# Patient Record
Sex: MALE | Race: WHITE | NOT HISPANIC OR LATINO | Employment: OTHER | URBAN - METROPOLITAN AREA
[De-identification: names, ages, dates, MRNs, and addresses within clinical notes are randomized per-mention and may not be internally consistent; named-entity substitution may affect disease eponyms.]

---

## 2021-07-10 ENCOUNTER — RECORDS - HEALTHEAST (OUTPATIENT)
Dept: LAB | Facility: CLINIC | Age: 86
End: 2021-07-10

## 2021-07-10 LAB
ALBUMIN UR-MCNC: ABNORMAL G/DL
APPEARANCE UR: ABNORMAL
BACTERIA #/AREA URNS HPF: ABNORMAL /[HPF]
BILIRUB UR QL STRIP: NEGATIVE
COLOR UR AUTO: YELLOW
GLUCOSE UR STRIP-MCNC: NEGATIVE MG/DL
HGB UR QL STRIP: ABNORMAL
KETONES UR STRIP-MCNC: NEGATIVE MG/DL
LEUKOCYTE ESTERASE UR QL STRIP: ABNORMAL
MUCOUS THREADS #/AREA URNS LPF: PRESENT LPF
NITRATE UR QL: NEGATIVE
PH UR STRIP: 7 [PH] (ref 5–8)
RBC URINE: >182 HPF
SP GR UR STRIP: 1.01 (ref 1–1.03)
SQUAMOUS EPITHELIAL: 0 /HPF
UROBILINOGEN UR STRIP-ACNC: ABNORMAL
WBC CLUMPS #/AREA URNS HPF: PRESENT /[HPF]
WBC URINE: >182 HPF

## 2021-07-12 LAB — BACTERIA SPEC CULT: ABNORMAL

## 2021-08-11 ENCOUNTER — LAB REQUISITION (OUTPATIENT)
Dept: LAB | Facility: CLINIC | Age: 86
End: 2021-08-11
Payer: MEDICARE

## 2021-08-11 DIAGNOSIS — Z01.812 ENCOUNTER FOR PREPROCEDURAL LABORATORY EXAMINATION: ICD-10-CM

## 2021-08-11 LAB
ALBUMIN UR-MCNC: 10 MG/DL
APPEARANCE UR: CLEAR
BACTERIA #/AREA URNS HPF: ABNORMAL /HPF
BILIRUB UR QL STRIP: NEGATIVE
COLOR UR AUTO: ABNORMAL
GLUCOSE UR STRIP-MCNC: NEGATIVE MG/DL
HGB UR QL STRIP: ABNORMAL
KETONES UR STRIP-MCNC: NEGATIVE MG/DL
LEUKOCYTE ESTERASE UR QL STRIP: ABNORMAL
NITRATE UR QL: NEGATIVE
PH UR STRIP: 7.5 [PH] (ref 5–7)
RBC URINE: 113 /HPF
SP GR UR STRIP: 1.01 (ref 1–1.03)
UROBILINOGEN UR STRIP-MCNC: <2 MG/DL
WBC URINE: 7 /HPF

## 2021-08-11 PROCEDURE — 81001 URINALYSIS AUTO W/SCOPE: CPT | Mod: ORL | Performed by: PHYSICIAN ASSISTANT

## 2022-01-28 ENCOUNTER — NURSE TRIAGE (OUTPATIENT)
Dept: NURSING | Facility: CLINIC | Age: 87
End: 2022-01-28
Payer: MEDICARE

## 2022-01-28 NOTE — TELEPHONE ENCOUNTER
Caller: Daniel -  [facility].  Phone # [phone]    Caller asking for discharge recommendations if patient is appropriate to stay at the Assisted Living following a nephrostomy.    FNA advised that we do not have records. Caller uncertain if pt was admitted at [facility] or a different hospital. FNA advised to have him reach out to family members to know where pt was admitted. He verbalized understanding.    Maci Bermudez RN/[place] Nurse Advisor      Reason for Disposition    Nursing judgment    Protocols used: NO GUIDELINE OR REFERENCE [id number]

## 2022-04-28 ENCOUNTER — LAB REQUISITION (OUTPATIENT)
Dept: LAB | Facility: CLINIC | Age: 87
End: 2022-04-28
Payer: MEDICARE

## 2022-04-28 DIAGNOSIS — I82.409 ACUTE EMBOLISM AND THROMBOSIS OF UNSPECIFIED DEEP VEINS OF UNSPECIFIED LOWER EXTREMITY (H): ICD-10-CM

## 2022-04-28 LAB
ANION GAP SERPL CALCULATED.3IONS-SCNC: 3 MMOL/L (ref 3–14)
BUN SERPL-MCNC: 27 MG/DL (ref 7–30)
CALCIUM SERPL-MCNC: 8.3 MG/DL (ref 8.5–10.1)
CHLORIDE BLD-SCNC: 109 MMOL/L (ref 94–109)
CO2 SERPL-SCNC: 27 MMOL/L (ref 20–32)
CREAT SERPL-MCNC: 1.21 MG/DL (ref 0.66–1.25)
ERYTHROCYTE [DISTWIDTH] IN BLOOD BY AUTOMATED COUNT: ABNORMAL %
GFR SERPL CREATININE-BSD FRML MDRD: 57 ML/MIN/1.73M2
GLUCOSE BLD-MCNC: 129 MG/DL (ref 70–99)
HCT VFR BLD AUTO: 25.8 % (ref 40–53)
HGB BLD-MCNC: 8 G/DL (ref 13.3–17.7)
MCH RBC QN AUTO: 36.5 PG (ref 26.5–33)
MCHC RBC AUTO-ENTMCNC: 31 G/DL (ref 31.5–36.5)
MCV RBC AUTO: 118 FL (ref 78–100)
PLATELET # BLD AUTO: 197 10E3/UL (ref 150–450)
POTASSIUM BLD-SCNC: 4.2 MMOL/L (ref 3.4–5.3)
RBC # BLD AUTO: 2.19 10E6/UL (ref 4.4–5.9)
SODIUM SERPL-SCNC: 139 MMOL/L (ref 133–144)
WBC # BLD AUTO: 4.4 10E3/UL (ref 4–11)

## 2022-04-28 PROCEDURE — 80048 BASIC METABOLIC PNL TOTAL CA: CPT | Mod: ORL | Performed by: NURSE PRACTITIONER

## 2022-04-28 PROCEDURE — 85027 COMPLETE CBC AUTOMATED: CPT | Mod: ORL | Performed by: NURSE PRACTITIONER

## 2022-05-05 ENCOUNTER — LAB REQUISITION (OUTPATIENT)
Dept: LAB | Facility: CLINIC | Age: 87
End: 2022-05-05
Payer: MEDICARE

## 2022-05-05 DIAGNOSIS — I82.409 ACUTE EMBOLISM AND THROMBOSIS OF UNSPECIFIED DEEP VEINS OF UNSPECIFIED LOWER EXTREMITY (H): ICD-10-CM

## 2022-05-05 LAB — HGB BLD-MCNC: 7.9 G/DL (ref 13.3–17.7)

## 2022-05-05 PROCEDURE — 85018 HEMOGLOBIN: CPT | Mod: ORL | Performed by: NURSE PRACTITIONER

## 2022-05-09 ENCOUNTER — LAB REQUISITION (OUTPATIENT)
Dept: LAB | Facility: CLINIC | Age: 87
End: 2022-05-09
Payer: MEDICARE

## 2022-05-09 DIAGNOSIS — I82.409 ACUTE EMBOLISM AND THROMBOSIS OF UNSPECIFIED DEEP VEINS OF UNSPECIFIED LOWER EXTREMITY (H): ICD-10-CM

## 2022-05-09 LAB — HGB BLD-MCNC: 8.1 G/DL (ref 13.3–17.7)

## 2022-05-09 PROCEDURE — 85018 HEMOGLOBIN: CPT | Mod: ORL | Performed by: NURSE PRACTITIONER

## 2022-05-16 ENCOUNTER — LAB REQUISITION (OUTPATIENT)
Dept: LAB | Facility: CLINIC | Age: 87
End: 2022-05-16
Payer: MEDICARE

## 2022-05-16 DIAGNOSIS — I82.409 ACUTE EMBOLISM AND THROMBOSIS OF UNSPECIFIED DEEP VEINS OF UNSPECIFIED LOWER EXTREMITY (H): ICD-10-CM

## 2022-05-16 LAB — HGB BLD-MCNC: 9 G/DL (ref 13.3–17.7)

## 2022-05-16 PROCEDURE — 85018 HEMOGLOBIN: CPT | Mod: ORL | Performed by: NURSE PRACTITIONER

## 2022-05-23 ENCOUNTER — LAB REQUISITION (OUTPATIENT)
Dept: LAB | Facility: CLINIC | Age: 87
End: 2022-05-23
Payer: MEDICARE

## 2022-05-23 DIAGNOSIS — I82.409 ACUTE EMBOLISM AND THROMBOSIS OF UNSPECIFIED DEEP VEINS OF UNSPECIFIED LOWER EXTREMITY (H): ICD-10-CM

## 2022-05-23 LAB — HGB BLD-MCNC: 8.1 G/DL (ref 13.3–17.7)

## 2022-05-23 PROCEDURE — 85018 HEMOGLOBIN: CPT | Mod: ORL | Performed by: NURSE PRACTITIONER

## 2022-05-31 ENCOUNTER — LAB REQUISITION (OUTPATIENT)
Dept: LAB | Facility: CLINIC | Age: 87
End: 2022-05-31
Payer: MEDICARE

## 2022-05-31 DIAGNOSIS — I82.409 ACUTE EMBOLISM AND THROMBOSIS OF UNSPECIFIED DEEP VEINS OF UNSPECIFIED LOWER EXTREMITY (H): ICD-10-CM

## 2022-05-31 LAB — HGB BLD-MCNC: 7.9 G/DL (ref 13.3–17.7)

## 2022-05-31 PROCEDURE — 85018 HEMOGLOBIN: CPT | Mod: ORL | Performed by: NURSE PRACTITIONER

## 2022-06-06 ENCOUNTER — LAB REQUISITION (OUTPATIENT)
Dept: LAB | Facility: CLINIC | Age: 87
End: 2022-06-06
Payer: MEDICARE

## 2022-06-06 DIAGNOSIS — I82.409 ACUTE EMBOLISM AND THROMBOSIS OF UNSPECIFIED DEEP VEINS OF UNSPECIFIED LOWER EXTREMITY (H): ICD-10-CM

## 2022-06-06 LAB — HGB BLD-MCNC: 8.8 G/DL (ref 13.3–17.7)

## 2022-06-06 PROCEDURE — 85018 HEMOGLOBIN: CPT | Mod: ORL | Performed by: NURSE PRACTITIONER

## 2022-06-13 ENCOUNTER — LAB REQUISITION (OUTPATIENT)
Dept: LAB | Facility: CLINIC | Age: 87
End: 2022-06-13
Payer: MEDICARE

## 2022-06-13 DIAGNOSIS — D50.9 IRON DEFICIENCY ANEMIA, UNSPECIFIED: ICD-10-CM

## 2022-06-13 LAB — HGB BLD-MCNC: 8.1 G/DL (ref 13.3–17.7)

## 2022-06-13 PROCEDURE — 85018 HEMOGLOBIN: CPT | Mod: ORL | Performed by: NURSE PRACTITIONER

## 2022-07-05 ENCOUNTER — LAB REQUISITION (OUTPATIENT)
Dept: LAB | Facility: CLINIC | Age: 87
End: 2022-07-05
Payer: MEDICARE

## 2022-07-05 DIAGNOSIS — D50.9 IRON DEFICIENCY ANEMIA, UNSPECIFIED: ICD-10-CM

## 2022-07-05 DIAGNOSIS — D62 ACUTE POSTHEMORRHAGIC ANEMIA: ICD-10-CM

## 2022-07-05 LAB — HGB BLD-MCNC: 8.5 G/DL (ref 13.3–17.7)

## 2022-07-05 PROCEDURE — 85018 HEMOGLOBIN: CPT | Mod: ORL | Performed by: NURSE PRACTITIONER

## 2023-09-14 ENCOUNTER — HOSPITAL ENCOUNTER (EMERGENCY)
Facility: CLINIC | Age: 88
Discharge: HOME | End: 2023-09-15
Attending: PHYSICIAN ASSISTANT | Admitting: PHYSICIAN ASSISTANT
Payer: MEDICARE

## 2023-09-14 ENCOUNTER — APPOINTMENT (OUTPATIENT)
Dept: GENERAL RADIOLOGY | Facility: CLINIC | Age: 88
End: 2023-09-14
Attending: PHYSICIAN ASSISTANT
Payer: MEDICARE

## 2023-09-14 DIAGNOSIS — S81.801A AVULSION OF SKIN OF RIGHT LOWER LEG, INITIAL ENCOUNTER: ICD-10-CM

## 2023-09-14 DIAGNOSIS — S51.019A SKIN TEAR OF ELBOW WITHOUT COMPLICATION, INITIAL ENCOUNTER: ICD-10-CM
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
## 2023-09-14 PROCEDURE — 73590 X-RAY EXAM OF LOWER LEG: CPT | Mod: RT

## 2023-09-14 PROCEDURE — 250N000013 HC RX MED GY IP 250 OP 250 PS 637: Performed by: PHYSICIAN ASSISTANT

## 2023-09-14 PROCEDURE — 250N000011 HC RX IP 250 OP 636: Performed by: PHYSICIAN ASSISTANT

## 2023-09-14 PROCEDURE — 90715 TDAP VACCINE 7 YRS/> IM: CPT | Performed by: PHYSICIAN ASSISTANT

## 2023-09-14 PROCEDURE — 90471 IMMUNIZATION ADMIN: CPT | Performed by: PHYSICIAN ASSISTANT

## 2023-09-14 PROCEDURE — 73080 X-RAY EXAM OF ELBOW: CPT | Mod: LT

## 2023-09-14 PROCEDURE — 99284 EMERGENCY DEPT VISIT MOD MDM: CPT | Mod: 25

## 2023-09-14 RX ORDER — CEPHALEXIN 500 MG/1
500 CAPSULE ORAL ONCE
Status: COMPLETED | OUTPATIENT
Start: 2023-09-14 | End: 2023-09-14

## 2023-09-14 RX ORDER — CEPHALEXIN 500 MG/1
500 CAPSULE ORAL 3 TIMES DAILY
Qty: 15 CAPSULE | Refills: 0 | Status: SHIPPED | OUTPATIENT
Start: 2023-09-15 | End: 2023-09-20

## 2023-09-14 RX ADMIN — CEPHALEXIN 500 MG: 500 CAPSULE ORAL at 22:03

## 2023-09-14 RX ADMIN — CLOSTRIDIUM TETANI TOXOID ANTIGEN (FORMALDEHYDE INACTIVATED), CORYNEBACTERIUM DIPHTHERIAE TOXOID ANTIGEN (FORMALDEHYDE INACTIVATED), BORDETELLA PERTUSSIS TOXOID ANTIGEN (GLUTARALDEHYDE INACTIVATED), BORDETELLA PERTUSSIS FILAMENTOUS HEMAGGLUTININ ANTIGEN (FORMALDEHYDE INACTIVATED), BORDETELLA PERTUSSIS PERTACTIN ANTIGEN, AND BORDETELLA PERTUSSIS FIMBRIAE 2/3 ANTIGEN 0.5 ML: 5; 2; 2.5; 5; 3; 5 INJECTION, SUSPENSION INTRAMUSCULAR at 20:19

## 2023-09-14 ASSESSMENT — ACTIVITIES OF DAILY LIVING (ADL)
ADLS_ACUITY_SCORE: 35
ADLS_ACUITY_SCORE: 35

## 2023-09-15 VITALS
SYSTOLIC BLOOD PRESSURE: 128 MMHG | HEART RATE: 70 BPM | OXYGEN SATURATION: 98 % | DIASTOLIC BLOOD PRESSURE: 80 MMHG | RESPIRATION RATE: 18 BRPM | TEMPERATURE: 98 F | BODY MASS INDEX: 18.56 KG/M2 | WEIGHT: 137 LBS | HEIGHT: 72 IN

## 2023-09-15 ASSESSMENT — ACTIVITIES OF DAILY LIVING (ADL): ADLS_ACUITY_SCORE: 35

## 2023-09-15 NOTE — ED NOTES
Emergency Department Technician Wound Irrigation Note:    9/14/2023    8:37 PM      Wound location:  Left Shoulder, Left Arm, Right Leg    Irrigation Fluid: Normal Saline    Estimated Irrigation Volume (60 mL fluid per cm): 1000 ml    Matti Mcfarland

## 2023-09-15 NOTE — ED PROVIDER NOTES
"    History     Chief Complaint:  Fall       HPI   Bird Burgess is a 89 year old male who presents via EMS from assisted living facilty after he caught his foot on something while trying maneuver his walker in the bathroom. He fell suffering a tear of the skin to his RLE as well as some bruising and pain to L elbow with skin tear.  He denies loss of consciousness or head trauma, as he remembers the entire fall. He sustained a laceration to both his right shin and left arm and elbow. The patient feels fine now, but denies other pain specifically no neck pain, headache, dizziness, chills, chest pain, sob, abdominal pain, numbness or weakness.  He is not on blood thinners.  Uses a walker to ambulate at baseline.    Independent Historian:    EMS reports and affirms above hx as reported by facility, they report patient did not stand after falling but they also didn't try to stand him up.    Review of External Notes:  none      Medications:    Vitamin D  Lexapro  Ferosul  Proscar  Robaxin  Protonix  Deltasone  Senokot  Mylicon  Flomax    Past Medical History:    Anxiety  Blood loss anemia  Basal cell carcinoma  Benign prostatic hyperplasia  DDD  GI hemorrhage  Levator syndrome  Lumbar scoliosis  Nephrolithiasis  Venous insufficiency  Thrombocytopenia  Hyperbilirubinemia    Past Surgical History:    Hernia repair  Thoracotomy  Lithotripsy  Malignant skin lesion excision  Knee arthroscopy, left  Colonoscopy  MOHS surgery  Cystolitholapaxy, retro pyelogram stent left     Physical Exam   Patient Vitals for the past 24 hrs:   BP Temp Temp src Pulse Resp SpO2 Height Weight   09/14/23 2102 139/69 -- -- 64 18 94 % -- --   09/14/23 1953 (!) 155/76 98  F (36.7  C) Oral 72 20 97 % 1.816 m (5' 11.5\") 62.1 kg (137 lb)      Physical Exam  General: Alert and awake. Elderly appering male.  Lying on gurney watching TV.  Head:  Scalp is NC/AT without bruising, hematomas.  Eyes:  Globes normal and atraumatic.  PERRL, EOMI   ENT:  No " bruising of facial bone ttp or step-offs.    TM's normal bilaterally    Oropharynx atraumatic.  Neck:  Normal range of motion without rigidity. No bruising or swelling.  CV:  Regular rate and rhythm. No M/R/G.  Resp:  Breath sounds are clear bilaterally. Normal effort.  Abdomen: Abdomen is soft, no distension, no tenderness, no masses.  MS:  No midline cervical, thoracic, or lumbar tenderness    No tenderness over sternum, scapula, ribs, clavicles.    PROM of all joints without pain. No bony ttp  Skin:  Large RLE triangular pretibial skin tear/avulsion which extends to subcutaneous tissue but not through muscle/tendon/deeper tissues.  Tissue flap in tact and present appears to be well perfused at present retracts into position easily.  No FB or bleeding vessels. Several very superficial skin tears to L elbow distal upper arm.  No lacerations or active bleeding.  Cap refill <2 seconds in fingers and toes.  2+DP and PT and radial pulses BL.   Neuro:  Alert and oriented.  Strength and sensation grossly intact in all 4 extremities.  Cranial nerves  2-12 intact. GCS: 15. Ambulates well with walker.  Psych:  Alert.  Normal affect.  Cooperative.      Emergency Department Course     Imaging:  Elbow  XR, G/E 3 views, left   Final Result   IMPRESSION: No acute fracture. Small chronic avulsion fracture of the lateral epicondyle. Small effusion. Osteopenia.      XR Tibia and Fibula Right 2 Views   Final Result   IMPRESSION: No fracture. No foreign body. Superficial soft tissue injury along the lateral aspect of the proximal half of the calf.        Report per radiology    Emergency Department Course & Assessments:       Interventions:  Medications   Tdap (tetanus-diphtheria-acell pertussis) (ADACEL) injection 0.5 mL (0.5 mLs Intramuscular $Given 9/14/23 2019)   cephALEXin (KEFLEX) capsule 500 mg (500 mg Oral $Given 9/14/23 2203)      Assessments:  2004 I obtained history and examined the patient as noted above.   2141 I  performed steri striped the patient's leg. I deemed the patient safe to discharge home.    Independent Interpretation (X-rays, CTs, rhythm strip):  I independently interpreted the patient's left elbow and right tib/fib x-rays note no evidence of fx, dislocation or radioopaque fb.    Consultations/Discussion of Management or Tests:  None       Social Determinants of Health affecting care:  Transportation complicates follow-up as pt does not drive lives at assisted living facility.  Does have family present who can be available sometimes to transport.     Disposition:  The patient was discharged to home.     Impression & Plan    CMS Diagnoses: None    Medical Decision Making:  Very pleasant 89-year-old male presents by EMS from assisted living facility after what sounds like clear mechanical fall while maneuvering his walker in a tight space.  Has fairly large skin tear/avulsion to the right lower extremity pretibial space but fortunately not overly deep.  Also superficial skin tears and bruising to the left elbow/distal upper arm.  X-rays negative for fracture or dislocation.  CMS intact no evidence of tendon injury, foreign body etc.  No LOC patient awake alert oriented able to clearly relay history of event do not suspect neurologic/cardiopulmonary/metabolic cause of episode.  Head to toe trauma exam otherwise nonconcerning.    Wounds were cleaned.  Not amenable to sutures as I discussed with patient and daughter/son-in-law.  No indication for emergent surgical/plastics consult.  Steri-Strips were applied and skin avulsion/tear of right pretibial space was able to be closed fairly effectively.  That being said this will likely take significant amount of time to heal and should have some wound care.  I will place referral in epic which is ordered.  They also have a primary physician who visits the facility that he has at who may be able to assist with wound care and/or placing orders/referral for wound care at home  which I explained I am not able to do stable call that physician tomorrow to discuss further.  No evidence of infection at this time but given the patient's very advanced age and large skin avulsion/tear to the lower extremity I think it is reasonable to prophylax with short course of Keflex to prevent infection discussed to monitor for signs of development as well as signs of CMS compromise etc. and to return if these develop.  Wound care instructions provided to facility.    Diagnosis:    ICD-10-CM    1. Fall, initial encounter  W19.XXXA       2. Skin tear of elbow without complication, initial encounter  S51.019A       3. Avulsion of skin of right lower leg, initial encounter  S81.801A Wound Care Referral         Discharge Medications:  New Prescriptions    CEPHALEXIN (KEFLEX) 500 MG CAPSULE    Take 1 capsule (500 mg) by mouth 3 times daily for 5 days      Scribe Disclosure:  TANNER, Shaan Dos Santos, am serving as a scribe at 8:16 PM on 9/14/2023 to document services personally performed by Car Jimenez PA-C based on my observations and the provider's statements to me.               Car Jimenez PA-C  09/15/23 1079

## 2023-09-15 NOTE — ED TRIAGE NOTES
Presents via EMS from [facility]. Reports he was attempting to get up from the toilet when his walker got away from him causing a mechanical fall. Skin tear right lower leg and left elbow.

## 2023-09-18 ENCOUNTER — TELEPHONE (OUTPATIENT)
Dept: WOUND CARE | Facility: CLINIC | Age: 88
End: 2023-09-18
Payer: MEDICARE

## 2023-09-18 NOTE — TELEPHONE ENCOUNTER
Consult received via [employer] from Car Jimenez PA-C  for wound of the leg and elbows    Please schedule with Jaylin Bryant PA-C, Cheryl Pope NP, Harlan Hurtado M.D., and Patrice Escalante M.D. at [facility] for next available appointment.    **For all providers, Macie Varner PA-C, Dr. Bloom, Cheryl Pope NP or Dr. Escalante, please schedule a follow up 2-3 weeks after initial appointment.**  --If unable to schedule within 2-3 weeks then please place on cancellation list--    Is the patient able to make their own medical decisions? Yes    Can the patient be scheduled on a Thursday? Yes    Is patient a ROSEMARY lift? PLEASE INQUIRE WHEN MAKING THE APPOINTMENT AND PUT IN APPOINTMENT NOTES    Routing to  Wound Healing Scheduling.

## 2024-08-02 ENCOUNTER — APPOINTMENT (OUTPATIENT)
Dept: CT IMAGING | Facility: CLINIC | Age: 89
End: 2024-08-02
Attending: EMERGENCY MEDICINE
Payer: MEDICARE

## 2024-08-02 ENCOUNTER — APPOINTMENT (OUTPATIENT)
Dept: GENERAL RADIOLOGY | Facility: CLINIC | Age: 89
End: 2024-08-02
Attending: EMERGENCY MEDICINE
Payer: MEDICARE

## 2024-08-02 ENCOUNTER — HOSPITAL ENCOUNTER (EMERGENCY)
Facility: CLINIC | Age: 89
Discharge: HOME | End: 2024-08-02
Attending: EMERGENCY MEDICINE | Admitting: EMERGENCY MEDICINE
Payer: MEDICARE

## 2024-08-02 VITALS
DIASTOLIC BLOOD PRESSURE: 77 MMHG | HEART RATE: 69 BPM | OXYGEN SATURATION: 96 % | RESPIRATION RATE: 16 BRPM | SYSTOLIC BLOOD PRESSURE: 139 MMHG | TEMPERATURE: 98 F
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
## 2024-08-02 DIAGNOSIS — M54.50 CHRONIC LOW BACK PAIN, UNSPECIFIED BACK PAIN LATERALITY, UNSPECIFIED WHETHER SCIATICA PRESENT: ICD-10-CM

## 2024-08-02 DIAGNOSIS — S01.01XA SCALP LACERATION, INITIAL ENCOUNTER: ICD-10-CM

## 2024-08-02 DIAGNOSIS — N20.0 NEPHROLITHIASIS: ICD-10-CM

## 2024-08-02 DIAGNOSIS — G89.29 CHRONIC LOW BACK PAIN, UNSPECIFIED BACK PAIN LATERALITY, UNSPECIFIED WHETHER SCIATICA PRESENT: ICD-10-CM

## 2024-08-02 LAB
ALBUMIN SERPL BCG-MCNC: 3.5 G/DL (ref 3.5–5.2)
ALP SERPL-CCNC: 98 U/L (ref 40–150)
ALT SERPL W P-5'-P-CCNC: 15 U/L (ref 0–70)
ANION GAP SERPL CALCULATED.3IONS-SCNC: 7 MMOL/L (ref 7–15)
AST SERPL W P-5'-P-CCNC: 22 U/L (ref 0–45)
ATRIAL RATE - MUSE: 55 BPM
BASOPHILS # BLD AUTO: 0 10E3/UL (ref 0–0.2)
BASOPHILS NFR BLD AUTO: 1 %
BILIRUB SERPL-MCNC: 0.6 MG/DL
BUN SERPL-MCNC: 31.1 MG/DL (ref 8–23)
CALCIUM SERPL-MCNC: 9 MG/DL (ref 8.8–10.4)
CHLORIDE SERPL-SCNC: 105 MMOL/L (ref 98–107)
CREAT SERPL-MCNC: 1.46 MG/DL (ref 0.67–1.17)
DIASTOLIC BLOOD PRESSURE - MUSE: NORMAL MMHG
EGFRCR SERPLBLD CKD-EPI 2021: 45 ML/MIN/1.73M2
EOSINOPHIL # BLD AUTO: 0.1 10E3/UL (ref 0–0.7)
EOSINOPHIL NFR BLD AUTO: 1 %
ERYTHROCYTE [DISTWIDTH] IN BLOOD BY AUTOMATED COUNT: 14.3 % (ref 10–15)
GLUCOSE SERPL-MCNC: 95 MG/DL (ref 70–99)
HCO3 SERPL-SCNC: 28 MMOL/L (ref 22–29)
HCT VFR BLD AUTO: 22.7 % (ref 40–53)
HGB BLD-MCNC: 7.8 G/DL (ref 13.3–17.7)
HOLD SPECIMEN: NORMAL
IMM GRANULOCYTES # BLD: 0 10E3/UL
IMM GRANULOCYTES NFR BLD: 0 %
INTERPRETATION ECG - MUSE: NORMAL
LYMPHOCYTES # BLD AUTO: 1.6 10E3/UL (ref 0.8–5.3)
LYMPHOCYTES NFR BLD AUTO: 45 %
MCH RBC QN AUTO: 41.5 PG (ref 26.5–33)
MCHC RBC AUTO-ENTMCNC: 34.4 G/DL (ref 31.5–36.5)
MCV RBC AUTO: 121 FL (ref 78–100)
MONOCYTES # BLD AUTO: 0.4 10E3/UL (ref 0–1.3)
MONOCYTES NFR BLD AUTO: 12 %
NEUTROPHILS # BLD AUTO: 1.5 10E3/UL (ref 1.6–8.3)
NEUTROPHILS NFR BLD AUTO: 40 %
NRBC # BLD AUTO: 0 10E3/UL
NRBC BLD AUTO-RTO: 0 /100
P AXIS - MUSE: 78 DEGREES
PLAT MORPH BLD: NORMAL
PLATELET # BLD AUTO: 152 10E3/UL (ref 150–450)
POTASSIUM SERPL-SCNC: 4.5 MMOL/L (ref 3.4–5.3)
PR INTERVAL - MUSE: 218 MS
PROT SERPL-MCNC: 5.9 G/DL (ref 6.4–8.3)
QRS DURATION - MUSE: 100 MS
QT - MUSE: 440 MS
QTC - MUSE: 420 MS
R AXIS - MUSE: 75 DEGREES
RBC # BLD AUTO: 1.88 10E6/UL (ref 4.4–5.9)
RBC MORPH BLD: NORMAL
SODIUM SERPL-SCNC: 140 MMOL/L (ref 135–145)
SYSTOLIC BLOOD PRESSURE - MUSE: NORMAL MMHG
T AXIS - MUSE: 78 DEGREES
VENTRICULAR RATE- MUSE: 55 BPM
WBC # BLD AUTO: 3.6 10E3/UL (ref 4–11)

## 2024-08-02 PROCEDURE — 99285 EMERGENCY DEPT VISIT HI MDM: CPT | Mod: 25

## 2024-08-02 PROCEDURE — 93005 ELECTROCARDIOGRAM TRACING: CPT

## 2024-08-02 PROCEDURE — 72100 X-RAY EXAM L-S SPINE 2/3 VWS: CPT

## 2024-08-02 PROCEDURE — 80053 COMPREHEN METABOLIC PANEL: CPT | Performed by: EMERGENCY MEDICINE

## 2024-08-02 PROCEDURE — 70450 CT HEAD/BRAIN W/O DYE: CPT | Mod: ME

## 2024-08-02 PROCEDURE — 85025 COMPLETE CBC W/AUTO DIFF WBC: CPT | Performed by: EMERGENCY MEDICINE

## 2024-08-02 PROCEDURE — 36415 COLL VENOUS BLD VENIPUNCTURE: CPT | Performed by: EMERGENCY MEDICINE

## 2024-08-02 PROCEDURE — 72192 CT PELVIS W/O DYE: CPT | Mod: MA

## 2024-08-02 PROCEDURE — 73502 X-RAY EXAM HIP UNI 2-3 VIEWS: CPT

## 2024-08-02 ASSESSMENT — COLUMBIA-SUICIDE SEVERITY RATING SCALE - C-SSRS
1. IN THE PAST MONTH, HAVE YOU WISHED YOU WERE DEAD OR WISHED YOU COULD GO TO SLEEP AND NOT WAKE UP?: NO
6. HAVE YOU EVER DONE ANYTHING, STARTED TO DO ANYTHING, OR PREPARED TO DO ANYTHING TO END YOUR LIFE?: NO
2. HAVE YOU ACTUALLY HAD ANY THOUGHTS OF KILLING YOURSELF IN THE PAST MONTH?: NO

## 2024-08-02 ASSESSMENT — ACTIVITIES OF DAILY LIVING (ADL)
ADLS_ACUITY_SCORE: 35

## 2024-08-02 NOTE — DISCHARGE INSTRUCTIONS
*You may resume diet and activities.  *Allow steristrips to fall off naturally.  *Return if you develop spreading rightness or warmth, altered mental status, vomiting, worsening pain, faint or feel like you will faint or become worse in any way.    Discharge Instructions  Back Pain  You were seen today for back pain. Back pain can have many causes, but most will get better without surgery or other specific treatment. Sometimes there is a herniated ( slipped ) disc. We do not usually do MRI scans to look for these right away, since most herniated discs will get better on their own with time.  Today, we did not find any evidence that your back pain was caused by a serious condition. However, sometimes symptoms develop over time and cannot be found during an emergency visit, so it is very important that you follow up with your primary provider.  Generally, every Emergency Department visit should have a follow-up clinic visit with either a primary or a specialty clinic/provider. Please follow-up as instructed by your emergency provider today.    Return to the Emergency Department if:  You develop a fever with your back pain.   You have weakness or change in sensation in one or both legs.  You lose control of your bowels or bladder, or cannot empty your bladder (cannot pee).  Your pain gets much worse.     Follow-up with your provider:  Unless your pain has completely gone away, please make an appointment with your provider within one week. Most of the routine care for back pain is available in a clinic and not the Emergency Department. You may need further management of your back pain, such as more pain medication, imaging such as an X-ray or MRI, or physical therapy.    What can I do to help myself?  Remain Active -- People are often afraid that they will hurt their back further or delay recovery by remaining active, but this is one of the best things you can do for your back. In fact, staying in bed for a long time to  rest is not recommended. Studies have shown that people with low back pain recover faster when they remain active. Movement helps to bring blood flow to the muscles and relieve muscle spasms as well as preventing loss of muscle strength.  Heat -- Using a heating pad can help with low back pain during the first few weeks. Do not sleep with a heating pad, as you can be burned.   Pain medications - You may take a pain medication such as Tylenol  (acetaminophen), Advil , Motrin  (ibuprofen) or Aleve  (naproxen).  If you were given a prescription for medicine here today, be sure to read all of the information (including the package insert) that comes with your prescription.  This will include important information about the medicine, its side effects, and any warnings that you need to know about.  The pharmacist who fills the prescription can provide more information and answer questions you may have about the medicine.  If you have questions or concerns that the pharmacist cannot address, please call or return to the Emergency Department.   Remember that you can always come back to the Emergency Department if you are not able to see your regular provider in the amount of time listed above, if you get any new symptoms, or if there is anything that worries you.

## 2024-08-02 NOTE — ED NOTES
Bed: ED20  Expected date:   Expected time:   Means of arrival:   Comments:  Shelley 2 89 M fall head strike no thinners eta [id number]

## 2024-08-02 NOTE — ED TRIAGE NOTES
PT arrives via EMS presenting from assisted living facility. Per EMS, pt slipped and hit the top of his head on the commode. Small skin tear to top of head. No LOC, no thinners. No head or neck pain.     Triage Assessment (Adult)       Row Name 08/02/24 0441          Triage Assessment    Airway WDL WDL        Respiratory WDL    Respiratory WDL WDL        Skin Circulation/Temperature WDL    Skin Circulation/Temperature WDL WDL        Cardiac WDL    Cardiac WDL WDL        Peripheral/Neurovascular WDL    Peripheral Neurovascular WDL WDL        Cognitive/Neuro/Behavioral WDL    Cognitive/Neuro/Behavioral WDL WDL

## 2024-08-02 NOTE — ED PROVIDER NOTES
Emergency Department Note      History of Present Illness     Chief Complaint   Fall    HPI   Bird Burgess is a 89 year old male with history of hypertension, chronic low back pain, and lumbar scoliosis  who presents to the ED for evaluation of a fall. The patient reports that he fell forward and hit his head on the lid of the toilet. He reports that he did not loose consciousness, had chest pain, or shortness of breath. The patient denies new or worsening back pack pain or pain elsewhere. His daughter had called and reported that her father has kidney issues and chronic back pain. The history is limited.    Independent Historian   Daughter as detailed above.    Review of External Notes   I consulted the patient's MIIC and his tetanus was updated in 2023.    Past Medical History     Medical History and Problem List   Actinic keratosis   Acute blood loss anemia   Anal fissure   Anxiety   Basal cell carcinoma   Bladder stone   BPH   Cognitive impairment  Chronic low back pain   Degenerative disc disease  Gastrointestinal hemorrhage   Hydronephrosis   Hyperbilirubinemia   Hypertension   Hypercholesteremia    Levator syndrome   Lumbar scoliosis   PMR   Pneumonia   Malignant neoplasm of scalp   Monoclonal gammopathy   Monoclonal paraproteinemia   Nephrolithiasis   Neural foraminal stenosis of lumbar spine  Urinary retention   Thrombocytopenia   UPJ obstruction   Varicose vein   Venous insufficiency   Visual impairment    Medications   Ativan   Deltasone   Flomax   Senokot   Robaxin   Mylanta   Mylicon   Lidoderm   Lexapro   Proscar   Protonix     Surgical History   No past surgical history on file.    Physical Exam     Patient Vitals for the past 24 hrs:   BP Temp Pulse Resp SpO2   08/02/24 0615 -- -- -- -- 97 %   08/02/24 0445 (!) 146/73 -- -- -- 98 %   08/02/24 0444 (!) 146/73 98  F (36.7  C) 54 16 100 %     Physical Exam  General: Well-nourished, appears uncomfortable, worried about the lotion on his head that  he states he needs to have wiped off.  eyes: PERRL, conjunctivae pink no scleral icterus or conjunctival injection  ENT:  Moist mucus membranes, posterior oropharynx clear without erythema or exudates  Respiratory:  Lungs clear to auscultation bilaterally, no crackles/rubs/wheezes.  Good air movement  CV: Normal rate and rhythm, no murmurs/rubs/gallops  GI:  Abdomen soft and non-distended.  Normoactive BS.  No tenderness, guarding or rebound  Skin: Warm, dry.  No rashes or petechiae.  1.0 cm curvilinear skin tear type laceration over the scalp.  Musculoskeletal: Diffuse lumbar spinal tenderness.  No thoracic or cervical spine tenderness.    Neuro: Alert and oriented to person/place/time.  No dysarthria.  No apparent cranial nerve deficits.  Moving all extremities.  Psychiatric: Normal affect      Diagnostics     Lab Results   Labs Ordered and Resulted from Time of ED Arrival to Time of ED Departure   COMPREHENSIVE METABOLIC PANEL - Abnormal       Result Value    Sodium 140      Potassium 4.5      Carbon Dioxide (CO2) 28      Anion Gap 7      Urea Nitrogen 31.1 (*)     Creatinine 1.46 (*)     GFR Estimate 45 (*)     Calcium 9.0      Chloride 105      Glucose 95      Alkaline Phosphatase 98      AST 22      ALT 15      Protein Total 5.9 (*)     Albumin 3.5      Bilirubin Total 0.6     CBC WITH PLATELETS AND DIFFERENTIAL - Abnormal    WBC Count 3.6 (*)     RBC Count 1.88 (*)     Hemoglobin 7.8 (*)     Hematocrit 22.7 (*)      (*)     MCH 41.5 (*)     MCHC 34.4      RDW 14.3      Platelet Count 152      % Neutrophils 40      % Lymphocytes 45      % Monocytes 12      % Eosinophils 1      % Basophils 1      % Immature Granulocytes 0      NRBCs per 100 WBC 0      Absolute Neutrophils 1.5 (*)     Absolute Lymphocytes 1.6      Absolute Monocytes 0.4      Absolute Eosinophils 0.1      Absolute Basophils 0.0      Absolute Immature Granulocytes 0.0      Absolute NRBCs 0.0     RBC AND PLATELET MORPHOLOGY    RBC Morphology  Confirmed RBC Indices      Platelet Assessment        Value: Automated Count Confirmed. Platelet morphology is normal.     Imaging   CT Pelvis Bone wo Contrast   Final Result   IMPRESSION:   1.  Chronic healed left inferior pubic ramus fracture deformity.   2.  No CT evidence for acute displaced sacral, pelvic or proximal   femur fracture. No CT finding for femoral head AVN.   3.  Degenerative change lower lumbar spine, both SI joints and each   hip.   4.  Sizable stones in each proximal renal collecting system with   bilateral pelviectasis, left larger than right and left-sided   nephrolithiasis. No definitive hydroureter or ureteral stone.   5.  Right inguinal hernia contains right colon and a portion of the   terminal ileum. No evidence for proximal intestinal obstruction.   6.  Additional incidental findings. See above.         CELENA ALFARO MD            SYSTEM ID:  [id number]      XR Pelvis w Hip Left 1 View   Final Result   IMPRESSION: Severe osteopenia. No displaced hip fracture or dislocation. Suggestion of contour irregularity along the left inferior pubic ramus concerning for fracture. Recommend CT for further evaluation. Severe degenerative change of the visualized    lower lumbar spine.      Lumbar spine XR, 2-3 views   Final Result   IMPRESSION: Diffuse osseous demineralization limits evaluation for nondisplaced fractures. Age indeterminate moderate T12 and L2 compression fractures. Chronic appearing mild right L4 and moderate left L5 vertebral body compression fractures with    associated levoscoliosis.      Head CT w/o contrast   Final Result   IMPRESSION:   1.  No CT evidence for acute intracranial process.   2.  Brain atrophy and presumed chronic microvascular ischemic changes as above.        EKG   ECG taken at 0510, ECG read at 0519  Sinus bradycardia with 1st degree AV block   Rate 55 bpm. OH interval 218 ms. QRS duration 100 ms. QT/QTc 440/420 ms. P-R-T axes 78 75 78.    Independent  Interpretation   I reviewed and interpreted the head CT and see no evidence of intracranial hemorrhage.    ED Course      Medications Administered   Medications - No data to display    Procedures   Procedures     Laceration Repair      Procedure: Laceration Repair    Indication: Laceration    Consent: Verbal    Tetanus status reviewed    Location:  Scalp     Length: 1.0  cm    Preparation: Irrigation with Sterile Saline.    Anesthesia/Sedation: None      Treatment/Exploration: Wound explored, no foreign bodies found     Closure: The wound was closed with Steri-Strips    Patient Status: The patient tolerated the procedure well: Yes. There were no complications.    Discussion of Management   None    ED Course   ED Course as of 08/02/24 0923   Fri Aug 02, 2024   0625 I obtained history and examined the patient as noted above.    0900 I updated the patient.  He tells me that he wants to go back to [facility] and help to get him home.   0917 I discussed findings with daughter Nat.  She reports that she is aware of compression fractures as well as kidney stones.  Aware of chronic anemia and chronic kidney disease.  Comfortable with the plan for discharge back to his facility at [facility].     Additional Documentation  None    Medical Decision Making / Diagnosis     CMS Diagnoses: None    MIPS       None    MDM   Bird Burgess is a 89 year old male who comes after bumping his head and sustaining a small skin tear over his scalp.  This was repaired with Steri-Strips.  His tetanus was up-to-date.  Given his age, head CT was indicated and obtained.  Fortunately, no signs of intracranial hemorrhage or skull fracture.  He also has chronic pain and it was difficult to sort out new pain from his chronic pain.  Imaging of his spine and pelvis was also obtained.  For concern of possible new cortical irregularity, pelvis CT scan was recommended and obtained.  Fortunately, no signs of acute fracture here.  He has a number of  compression fractures which his daughter reports are known.  Additionally, he has nephrolithiasis which his daughter also reports is known.  He has a baseline chronic anemia which is not worsened and his renal function is near his baseline.  He is very eager to be discharged home.  I did contact his daughter who is comfortable with the plan and aware of all the incidental findings which were seen on CT scans and imaging.  At this time, with reasonable clinical confidence, I do believe he is safe for discharge back to his home.    Disposition   The patient was discharged.     Diagnosis     ICD-10-CM    1. Fall, initial encounter  W19.XXXA       2. Scalp laceration, initial encounter  S01.01XA       3. Chronic low back pain, unspecified back pain laterality, unspecified whether sciatica present  M54.50     G89.29       4. Nephrolithiasis  N20.0          Discharge Medications   New Prescriptions    No medications on file     Olga HART, am serving as a scribe at 6:11 AM on 8/2/2024 to document services personally performed by Venecia Curiel MD based on my observations and the provider's statements to me.        Venecia Curiel MD  08/02/24 1527